# Patient Record
Sex: MALE | Race: OTHER | NOT HISPANIC OR LATINO | URBAN - METROPOLITAN AREA
[De-identification: names, ages, dates, MRNs, and addresses within clinical notes are randomized per-mention and may not be internally consistent; named-entity substitution may affect disease eponyms.]

---

## 2017-03-13 ENCOUNTER — EMERGENCY (EMERGENCY)
Facility: HOSPITAL | Age: 27
LOS: 1 days | Discharge: HOME | End: 2017-03-13
Attending: EMERGENCY MEDICINE | Admitting: EMERGENCY MEDICINE
Payer: MEDICAID

## 2017-03-13 VITALS
OXYGEN SATURATION: 100 % | RESPIRATION RATE: 16 BRPM | SYSTOLIC BLOOD PRESSURE: 104 MMHG | TEMPERATURE: 98 F | HEART RATE: 73 BPM | DIASTOLIC BLOOD PRESSURE: 46 MMHG | WEIGHT: 205.03 LBS | HEIGHT: 68 IN

## 2017-03-13 PROCEDURE — 99284 EMERGENCY DEPT VISIT MOD MDM: CPT | Mod: 25

## 2017-03-13 NOTE — ED ADULT TRIAGE NOTE - CHIEF COMPLAINT QUOTE
Pt with complaints of R sided abdominal pain that started 2 days ago, pt was seen at [facility] ER yesterday had CT done  and sent home with negative results, denies N/V admits to diarrhea, pt states the pain is the worst its been today

## 2017-03-14 VITALS
OXYGEN SATURATION: 100 % | DIASTOLIC BLOOD PRESSURE: 39 MMHG | HEART RATE: 71 BPM | SYSTOLIC BLOOD PRESSURE: 128 MMHG | RESPIRATION RATE: 16 BRPM

## 2017-03-14 RX ORDER — KETOROLAC TROMETHAMINE 30 MG/ML
60 SYRINGE (ML) INJECTION ONCE
Qty: 0 | Refills: 0 | Status: DISCONTINUED | OUTPATIENT
Start: 2017-03-14 | End: 2017-03-14

## 2017-03-14 RX ADMIN — Medication 60 MILLIGRAM(S): at 03:21

## 2017-03-14 RX ADMIN — Medication 60 MILLIGRAM(S): at 03:47

## 2017-03-14 NOTE — ED PROVIDER NOTE - ATTENDING CONTRIBUTION TO CARE
pt with r sided abd pain worse with movement.  Had labs and CT yesterday.  Labs unremarkable.  CT with non specific inflammation of the TI with a normal appendix.  Here has a benign abd exam.   Early shingles is on differential.  Will treat symptomatically.  No indication for new imaging.

## 2017-03-14 NOTE — ED PROVIDER NOTE - OBJECTIVE STATEMENT
2 days or RUQ pain radiating to flank 7/10, worst w/ movement, +2 episodes of loose stools,   Patient was evaluated in [facility] yesterday with CT negative. Patient has been taking tylenol with minimal relief.   Denies recent travel, sick contacts.

## 2017-03-14 NOTE — ED ADULT NURSE NOTE - OBJECTIVE STATEMENT
Break coverage RN received pt to spot 8 A&Ox4 c/o RUQ pain since yesterday, denies N/V associated, reports mild diarrhea. Appears comfortable on assessment, no tenderness noted on palpation, denies trouble urinating. VS as noted, MD at bedside, will reassess.

## 2017-03-14 NOTE — ED PROVIDER NOTE - MEDICAL DECISION MAKING DETAILS
29 yo M with right sided abdominal pain, worst with movement, normal vital and normal CT from before.

## 2017-03-14 NOTE — ED ADULT NURSE NOTE - CHIEF COMPLAINT QUOTE
Pt with complaints of R sided abdominal pain that started 2 days ago, pt was seen at [facility] ER yesterday had CT done  and sent home with negative results, denies N/V admits to diarrhea, pt states the pain is the worst its been today

## 2017-04-17 ENCOUNTER — EMERGENCY (EMERGENCY)
Facility: HOSPITAL | Age: 27
LOS: 1 days | Discharge: HOME | End: 2017-04-17
Attending: EMERGENCY MEDICINE | Admitting: EMERGENCY MEDICINE
Payer: MEDICAID

## 2017-04-17 VITALS
DIASTOLIC BLOOD PRESSURE: 71 MMHG | RESPIRATION RATE: 20 BRPM | OXYGEN SATURATION: 100 % | TEMPERATURE: 98 F | HEART RATE: 73 BPM | SYSTOLIC BLOOD PRESSURE: 117 MMHG

## 2017-04-17 VITALS
TEMPERATURE: 99 F | HEIGHT: 68 IN | OXYGEN SATURATION: 100 % | WEIGHT: 212.97 LBS | RESPIRATION RATE: 71 BRPM | DIASTOLIC BLOOD PRESSURE: 63 MMHG | SYSTOLIC BLOOD PRESSURE: 125 MMHG | HEART RATE: 69 BPM

## 2017-04-17 LAB
CK SERPL-CCNC: 257 U/L — HIGH (ref 30–200)
CK SERPL-CCNC: 271 U/L — HIGH (ref 30–200)
TROPONIN T SERPL-MCNC: < 0.06 NG/ML — SIGNIFICANT CHANGE UP (ref 0–0.06)
TROPONIN T SERPL-MCNC: < 0.06 NG/ML — SIGNIFICANT CHANGE UP (ref 0–0.06)

## 2017-04-17 PROCEDURE — 93010 ELECTROCARDIOGRAM REPORT: CPT

## 2017-04-17 PROCEDURE — 71020: CPT | Mod: 26

## 2017-04-17 PROCEDURE — 99285 EMERGENCY DEPT VISIT HI MDM: CPT | Mod: 25

## 2017-04-17 RX ORDER — KETOROLAC TROMETHAMINE 30 MG/ML
30 SYRINGE (ML) INJECTION ONCE
Qty: 0 | Refills: 0 | Status: DISCONTINUED | OUTPATIENT
Start: 2017-04-17 | End: 2017-04-17

## 2017-04-17 RX ORDER — FAMOTIDINE 10 MG/ML
1 INJECTION INTRAVENOUS
Qty: 14 | Refills: 0 | OUTPATIENT
Start: 2017-04-17 | End: 2017-05-01

## 2017-04-17 RX ORDER — ACETAMINOPHEN 500 MG
975 TABLET ORAL ONCE
Qty: 0 | Refills: 0 | Status: COMPLETED | OUTPATIENT
Start: 2017-04-17 | End: 2017-04-17

## 2017-04-17 RX ORDER — FAMOTIDINE 10 MG/ML
40 INJECTION INTRAVENOUS ONCE
Qty: 0 | Refills: 0 | Status: DISCONTINUED | OUTPATIENT
Start: 2017-04-17 | End: 2017-04-17

## 2017-04-17 RX ORDER — FAMOTIDINE 10 MG/ML
20 INJECTION INTRAVENOUS ONCE
Qty: 0 | Refills: 0 | Status: COMPLETED | OUTPATIENT
Start: 2017-04-17 | End: 2017-04-17

## 2017-04-17 RX ADMIN — FAMOTIDINE 20 MILLIGRAM(S): 10 INJECTION INTRAVENOUS at 18:22

## 2017-04-17 RX ADMIN — Medication 30 MILLILITER(S): at 17:48

## 2017-04-17 RX ADMIN — Medication 975 MILLIGRAM(S): at 17:48

## 2017-04-17 NOTE — ED PROVIDER NOTE - ATTENDING CONTRIBUTION TO CARE
I , Estephania Olmos M.D. have examined the patient and confirmed the essential components of the history, physical examination, diagnosis, and treatment plan. I agree with the patient's care as documented by the PA and amended herein by me. See note above for complete details of service.  25 yo M no PMHx, FamHx who pw chest and neck pain x several hours. Pain began 1.5 hours after vomiting and forceful coughing this am. No similar prior symptoms. Pt reports that he had diaphoresis at symptom onset. Pain is currently mild. No further vomiting. Exam as above, + reproducible chest wall ttp, clear lung, RRR, equal pulses B/L UE and LE. Plan trop, symptom control, cxr ro ptx/pneumomediastinum. Although symptom onset ~ 6hr, consider 4hr trop. Reassess.

## 2017-04-17 NOTE — ED PROVIDER NOTE - PROGRESS NOTE DETAILS
SHEREE Chandler - unable to find pt in the results waiting, was going to advised pt about 2nd set of enzymes being drown at 10, unable to find pt, pt left before eval was over, didn't have hep lock.

## 2017-04-17 NOTE — ED PROVIDER NOTE - CARE PLAN
Principal Discharge DX:	Chest pain Principal Discharge DX:	Chest pain  Instructions for follow-up, activity and diet:	PLEASE MAKE SURE THAT YOU FOLLOW UP WITH YOUR DOCTOR WITHIN NEXT 48HRS ALSO SCHEDULE AN APPOINTMENT TO SEE CARDIOLOGIST WITHIN NEXT 1-2 WKS IF THE PAIN PERSISTS. TAKE PEPCID AS PRESCRIBED; RETURN TO ER FRO WORSENING PAIN, SHORTNESS OF BREATH, PERSISTENT VOMITING.

## 2017-04-17 NOTE — ED PROVIDER NOTE - MEDICAL DECISION MAKING DETAILS
reproducible Cp in young male, pt denies any personal/family CAd risk factors, PERC negative, ekg with incomplete RBBB, no acute Risa or TWI, no evidence of Rt sided heart strain - CXR, pain contrl, NSAIDS will reasses.

## 2017-04-17 NOTE — ED PROVIDER NOTE - OBJECTIVE STATEMENT
Pt is 25 y/o male with no significant PMHx here for eval of midsternal CP since today am. Pt states that he felt nauseous upon awakening, vomited x 1 and immediately afterwards started experiencing the pain. Pain is sharp, constant, worse with deep inspiration and with movement, pt reports occasional radiation to Rt side of chest. (-) cough, diaphoresis, jaw/lue pain, denies abd pain. denies recent travel/prolonged immobilization, personal/family hx of coagulopathies, non smoker, , denies cocaine/illicit substances use, denies hx of unexplained/early deaths in family. (-) HA, dizziness, visual changes; PMd - Dr Rojas.

## 2017-04-17 NOTE — ED ADULT TRIAGE NOTE - CHIEF COMPLAINT QUOTE
Pt c/o of neck and chest pain worse with movement pt states he feels like he can't get a good breath in. No respiratory distress noted in triage. Pt denies any medical hx

## 2017-04-17 NOTE — ED PROVIDER NOTE - PLAN OF CARE
PLEASE MAKE SURE THAT YOU FOLLOW UP WITH YOUR DOCTOR WITHIN NEXT 48HRS ALSO SCHEDULE AN APPOINTMENT TO SEE CARDIOLOGIST WITHIN NEXT 1-2 WKS IF THE PAIN PERSISTS. TAKE PEPCID AS PRESCRIBED; RETURN TO ER FRO WORSENING PAIN, SHORTNESS OF BREATH, PERSISTENT VOMITING.

## 2017-09-23 ENCOUNTER — EMERGENCY (EMERGENCY)
Facility: HOSPITAL | Age: 27
LOS: 1 days | Discharge: HOME | End: 2017-09-23
Attending: EMERGENCY MEDICINE | Admitting: EMERGENCY MEDICINE
Payer: MEDICAID

## 2017-09-23 VITALS
TEMPERATURE: 98 F | DIASTOLIC BLOOD PRESSURE: 62 MMHG | SYSTOLIC BLOOD PRESSURE: 108 MMHG | RESPIRATION RATE: 17 BRPM | OXYGEN SATURATION: 100 % | HEART RATE: 68 BPM

## 2017-09-23 VITALS
TEMPERATURE: 98 F | HEART RATE: 71 BPM | SYSTOLIC BLOOD PRESSURE: 120 MMHG | RESPIRATION RATE: 18 BRPM | OXYGEN SATURATION: 100 % | DIASTOLIC BLOOD PRESSURE: 65 MMHG

## 2017-09-23 LAB
HIV1 AG SER QL: SIGNIFICANT CHANGE UP
HIV1+2 AB SPEC QL: SIGNIFICANT CHANGE UP

## 2017-09-23 PROCEDURE — 99284 EMERGENCY DEPT VISIT MOD MDM: CPT

## 2017-09-23 PROCEDURE — 70450 CT HEAD/BRAIN W/O DYE: CPT | Mod: 26

## 2017-09-23 RX ORDER — ACETAMINOPHEN 500 MG
650 TABLET ORAL ONCE
Qty: 0 | Refills: 0 | Status: COMPLETED | OUTPATIENT
Start: 2017-09-23 | End: 2017-09-23

## 2017-09-23 RX ORDER — IBUPROFEN 200 MG
800 TABLET ORAL ONCE
Qty: 0 | Refills: 0 | Status: COMPLETED | OUTPATIENT
Start: 2017-09-23 | End: 2017-09-23

## 2017-09-23 RX ADMIN — Medication 800 MILLIGRAM(S): at 19:36

## 2017-09-23 RX ADMIN — Medication 650 MILLIGRAM(S): at 14:22

## 2017-09-23 RX ADMIN — Medication 800 MILLIGRAM(S): at 14:22

## 2017-09-23 NOTE — ED PROVIDER NOTE - PLAN OF CARE
Rest, drink plenty of fluids.  Advance activity as tolerated.  Follow up with your primary care physician in 48-72 hours- bring copies of your results.  Return to the ER for worsening or persistent symptoms, and/or ANY NEW OR CONCERNING SYMPTOMS. If you have issues obtaining follow up, please call: [phone] to obtain a doctor or specialist who takes your insurance in your area.

## 2017-09-23 NOTE — ED PROVIDER NOTE - MEDICAL DECISION MAKING DETAILS
27y/o M with HA. Very well appearing with normal neuro exam. Has not taken any medication in midst of having worsening distance vision. Will give pain medications and reassess.

## 2017-09-23 NOTE — ED PROVIDER NOTE - OBJECTIVE STATEMENT
25y/o M presents to the ED c/o HA, "head pain" x2w. He reports recent diminished vision, increased difficulty seeing distances. He reports some short term memory deficits. He denies any recent head injury. He has not taken any medication PTA. He says he has pressure on the sides of his head, 7/10. He has an appointment with his PMD in 3d. Denies LOC, fever, vomiting, recent trauma, any other complaints. NKDA.

## 2017-09-23 NOTE — ED ADULT NURSE REASSESSMENT NOTE - NS ED NURSE REASSESS COMMENT FT1
Pt. in RW; alert, awake, reports improvement of headache after medications; VS as noted, respirations even, unlabored. Awaiting results of head CT in NAD.

## 2017-09-23 NOTE — ED PROVIDER NOTE - PROGRESS NOTE DETAILS
Dr. Ugarte called back for Dr. Arreaga. Dr. Ugarte is aware the pt is here, agrees with plan, Dr. Arreaga will see pt on Monday. pt was not feeling better after meds so CT ordered. Likely outpt followup after CT results

## 2017-09-23 NOTE — ED PROVIDER NOTE - CARE PLAN
Principal Discharge DX:	Headache Principal Discharge DX:	Headache  Instructions for follow-up, activity and diet:	Rest, drink plenty of fluids.  Advance activity as tolerated.  Follow up with your primary care physician in 48-72 hours- bring copies of your results.  Return to the ER for worsening or persistent symptoms, and/or ANY NEW OR CONCERNING SYMPTOMS. If you have issues obtaining follow up, please call: [phone] to obtain a doctor or specialist who takes your insurance in your area.

## 2018-10-10 NOTE — ED PROVIDER NOTE - PROGRESS NOTE DETAILS
Speaking Coherently Patient feels better after Toradol. Normal Ambulatory. Attending spoke with ER at [facility], CT showed terminal ileitis without appendicitis. Will f/u with GI. Return instructions given.

## 2020-01-17 NOTE — ED PROVIDER NOTE - BOWEL SOUNDS
present x 4 quadrants ASA: II  Mallampati: II  12 lead EKG Sinus Bradycardia @ 54 BPM with no acute changes.

## 2023-04-21 NOTE — ED PROVIDER NOTE - CPE EDP RESP NORM
normal... You can access the FollowMyHealth Patient Portal offered by [facility] by registering at the following website: [email or web address]. By joining [employer]’s FollowMyHealth portal, you will also be able to view your health information using other applications (apps) compatible with our system.
